# Patient Record
Sex: FEMALE | Race: WHITE | Employment: PART TIME | URBAN - METROPOLITAN AREA
[De-identification: names, ages, dates, MRNs, and addresses within clinical notes are randomized per-mention and may not be internally consistent; named-entity substitution may affect disease eponyms.]

---

## 2018-01-03 ENCOUNTER — HOSPITAL ENCOUNTER (OUTPATIENT)
Dept: MAMMOGRAPHY | Age: 78
Discharge: HOME | End: 2018-01-03
Attending: FAMILY MEDICINE | Admitting: FAMILY MEDICINE

## 2018-01-03 DIAGNOSIS — Z12.31 VISIT FOR SCREENING MAMMOGRAM: ICD-10-CM

## 2019-01-04 ENCOUNTER — HOSPITAL ENCOUNTER (OUTPATIENT)
Dept: MAMMOGRAPHY | Age: 79
Discharge: HOME | End: 2019-01-04
Payer: MEDICARE

## 2019-01-04 DIAGNOSIS — Z12.31 VISIT FOR SCREENING MAMMOGRAM: ICD-10-CM

## 2019-01-04 PROCEDURE — 77063 BREAST TOMOSYNTHESIS BI: CPT

## 2020-01-17 ENCOUNTER — HOSPITAL ENCOUNTER (OUTPATIENT)
Dept: MAMMOGRAPHY | Age: 80
Discharge: HOME | End: 2020-01-17
Payer: MEDICARE

## 2020-01-17 PROCEDURE — 77063 BREAST TOMOSYNTHESIS BI: CPT

## 2021-01-19 ENCOUNTER — HOSPITAL ENCOUNTER (OUTPATIENT)
Dept: MAMMOGRAPHY | Age: 81
Discharge: HOME | End: 2021-01-19
Payer: MEDICARE

## 2021-01-19 DIAGNOSIS — Z12.31 VISIT FOR SCREENING MAMMOGRAM: ICD-10-CM

## 2021-01-19 PROCEDURE — 77063 BREAST TOMOSYNTHESIS BI: CPT

## 2021-02-05 ENCOUNTER — IMMUNIZATION (OUTPATIENT)
Dept: FAMILY MEDICINE CLINIC | Age: 81
End: 2021-02-05
Payer: MEDICARE

## 2021-02-05 PROCEDURE — 91301 COVID-19, MODERNA VACCINE 100MCG/0.5ML DOSE: CPT | Performed by: FAMILY MEDICINE

## 2021-02-05 PROCEDURE — 0011A COVID-19, MODERNA VACCINE 100MCG/0.5ML DOSE: CPT | Performed by: FAMILY MEDICINE

## 2021-03-05 ENCOUNTER — IMMUNIZATION (OUTPATIENT)
Dept: FAMILY MEDICINE CLINIC | Age: 81
End: 2021-03-05
Payer: MEDICARE

## 2021-03-05 PROCEDURE — 91301 COVID-19, MODERNA VACCINE 100MCG/0.5ML DOSE: CPT | Performed by: NURSE PRACTITIONER

## 2021-03-05 PROCEDURE — 0012A COVID-19, MODERNA VACCINE 100MCG/0.5ML DOSE: CPT | Performed by: NURSE PRACTITIONER

## 2022-02-14 ENCOUNTER — HOSPITAL ENCOUNTER (OUTPATIENT)
Dept: MAMMOGRAPHY | Age: 82
Discharge: HOME | End: 2022-02-14
Payer: MEDICARE

## 2022-02-14 VITALS — WEIGHT: 135 LBS | BODY MASS INDEX: 22.49 KG/M2 | HEIGHT: 65 IN

## 2022-02-14 DIAGNOSIS — Z12.31 VISIT FOR SCREENING MAMMOGRAM: ICD-10-CM

## 2022-02-14 PROCEDURE — 77063 BREAST TOMOSYNTHESIS BI: CPT

## 2023-02-16 ENCOUNTER — HOSPITAL ENCOUNTER (OUTPATIENT)
Dept: MAMMOGRAPHY | Age: 83
Discharge: HOME | End: 2023-02-16
Payer: MEDICARE

## 2023-02-16 VITALS — HEIGHT: 65 IN | WEIGHT: 138 LBS | BODY MASS INDEX: 22.99 KG/M2

## 2023-02-16 DIAGNOSIS — Z12.39 SCREENING BREAST EXAMINATION: ICD-10-CM

## 2023-02-16 PROCEDURE — 77067 SCR MAMMO BI INCL CAD: CPT

## 2024-02-21 ENCOUNTER — HOSPITAL ENCOUNTER (OUTPATIENT)
Dept: MAMMOGRAPHY | Age: 84
Discharge: HOME | End: 2024-02-21
Payer: MEDICARE

## 2024-02-21 VITALS — WEIGHT: 128 LBS | BODY MASS INDEX: 21.33 KG/M2 | HEIGHT: 65 IN

## 2024-02-21 DIAGNOSIS — Z12.31 VISIT FOR SCREENING MAMMOGRAM: ICD-10-CM

## 2024-02-21 PROCEDURE — 77063 BREAST TOMOSYNTHESIS BI: CPT

## 2024-02-27 ENCOUNTER — PROCEDURE VISIT (OUTPATIENT)
Dept: SURGERY | Age: 84
End: 2024-02-27
Payer: MEDICARE

## 2024-02-27 VITALS — DIASTOLIC BLOOD PRESSURE: 73 MMHG | HEART RATE: 101 BPM | SYSTOLIC BLOOD PRESSURE: 140 MMHG

## 2024-02-27 DIAGNOSIS — C44.722 SQUAMOUS CELL CARCINOMA OF LEG, RIGHT: Primary | ICD-10-CM

## 2024-02-27 PROCEDURE — 12032 INTMD RPR S/A/T/EXT 2.6-7.5: CPT | Performed by: DERMATOLOGY

## 2024-02-27 PROCEDURE — 17313 MOHS 1 STAGE T/A/L: CPT | Performed by: DERMATOLOGY

## 2024-02-27 NOTE — PROGRESS NOTES
PRE-PROCEDURE SCREENING    Pacemaker/ICD: No  Difficulty with numbing in the past: No  Local Anesthesia Reaction/passing out: No  Latex or adhesive allergy:  No  Bleeding/Clotting Disorders: No  Anticoagulant Therapy: No  Joint prosthesis: No  Artificial Heart Valve: No  Stroke or Seizures: No  Organ Transplant or Lymphoma: No  Immunosuppression: No  Respiratory Problems: No

## 2024-02-27 NOTE — PROGRESS NOTES
MOHS PROCEDURE NOTE    PHYSICIAN:  Gianna Luz MD, Who operated in two distinct and integrated capacities as the surgeon removing the tissue and as the pathologist examining the tissue.    ASSISTANT: Maribeth Goldberg CMA      REFERRING PROVIDER:  Stevan Warren DO    PREOPERATIVE DIAGNOSIS: Invasive well-differentiated Squamous Cell Carcinoma     SPECIFIC MOHS INDICATIONS:  location and need for tissue conservation    AUC SCORIN/9    POSTOPERATIVE DIAGNOSIS: SAME    LOCATION: Right leg    OPERATIVE PROCEDURE:  MOHS MICROGRAPHIC SURGERY    RECONSTRUCTION OF DEFECT: Intermediate layered closure    PREOPERATIVE SIZE: 9 x 6 MM    DEFECT SIZE: 15 x 10 MM    LENGTH OF REPAIRED WOUND/SIZE OF FLAP/SIZE OF GRAFT:  36 MM    ANESTHESIA:  10 mL 1% lidocaine with epinephrine 1:100,000 buffered.     EBL:  MINIMAL    DURATION OF PROCEDURE:  1.0 HOURS    POSTOPERATIVE OBSERVATION: 0.5 HOUR    SPECIMENS:  SEE MOHS MAP    COMPLICATIONS:  NONE    DESCRIPTION OF PROCEDURE:  The patient was given a mirror, as appropriate, and the biopsy site was identified, marked with a surgical marking pen, and verified by the patient.   Options for treatment were discussed and the patient was informed that Mohs surgery was the selected treatment based on its lower recurrence rate, given the features listed above, as compared to other treatment modalities such as excision, radiation, or curettage, and agreed with this treatment plan.  Risks and benefits including bruising, swelling, bleeding, infection, nerve injury, recurrence, and scarring were discussed with the patient prior to the procedure and a written consent detailing these and other risks was reviewed with the patient and signed.    There was a time out for person and procedure verification.  The surgical site was prepped with an antiseptic solution.  Application of an antiseptic solution was repeated before each surgical stage.      Stage I:  The clinically-apparent tumor was

## 2024-02-27 NOTE — PATIENT INSTRUCTIONS
[facility] Office Hours:    Monday-Thursday  7:30 AM-4:30 PM    Friday  9:00 AM-1:00 PM       POST-OPERATIVE CARE FOR STITCHES  Bandage change after 48 hours    CARING FOR YOUR SURGICAL SITE  The bandage should remain on and completely dry for 48 hours. Do NOT get the bandage wet.  After the first 48 hours, gently remove the remaining part of the bandage. It can be helpful to moisten the bandage edges in the shower. Steri strips may still be on the wound. It is ok, they will fall off slowly with the daily bandage changes.  Gently clean the wound daily with mild soap and water. Try to clean off crust and debris.   Dry (pat) the area with a clean Q-tip or gauze.   Apply a layer of Vaseline/ Aquaphor (or Bacitracin if your doctor recommends) to the wound area only.  Cut a piece of Telfa (or any non-stick dressing) to fit just over the wound and secure it with paper tape. If the wound is small you may use a Band- Aid. Keep area covered for a total of 2 week(s).  If the dressing comes off or if you have questions, or concerns about the dressing, please call the office for instructions!    POST OPERATIVE INSTRUCTIONS    Activity: Do not lift anything heavier than a gallon of milk for 1 week. Also, avoid strenuous activity such as running, power walking or contact sports.  Eating and drinking: Do not drink alcohol for 48 hours after your procedure. Alcohol increases the chances of bleeding.  Medicines   -If you have discomfort, take Acetaminophen (Tylenol or Extra Strength Tylenol). Follow the instructions and warning on the bottle.  -If your doctor has prescribed you an Aspirin daily, please keep taking it. Do not take extra Aspirin or medicines containing Aspirin (such as Tammie-Boston and Excedrin) for 48 hours after your procedure.    Bleeding: If bleeding occurs, DO NOT remove the bandage. Put firm pressure on the area with gauze for 20 minutes without peeking. If the bleeding continues, apply

## 2024-02-28 ENCOUNTER — TELEPHONE (OUTPATIENT)
Dept: SURGERY | Age: 84
End: 2024-02-28

## 2024-02-28 NOTE — TELEPHONE ENCOUNTER
The patient was in the office on 02/27/2024 for Mohs located on the RT leg with ILC repair.  The patient tolerated the procedure well and left the office in good condition.    A post-operative telephone call was placed at 10:18a, 02/28/2024, in order to check on the patient's recovery process.  The patient was not able to be reached and a phone message was left. L/M to call back w/any questions/concerns.

## 2024-02-28 NOTE — TELEPHONE ENCOUNTER
PT calling to make sure the redness was normal.  Was a little warm to touch but she assumed it was from the surgery.  Reminded PT to keep leg elevated when sitting. She will keep an eye on it and if the redness extends significantly she will call. She will take bandage off tomorrow.

## 2024-02-28 NOTE — TELEPHONE ENCOUNTER
Patient returning f/u procedure call. Pt says she does have some questions regarding her procedure from yesterday.

## 2024-03-01 ENCOUNTER — HOSPITAL ENCOUNTER (OUTPATIENT)
Dept: ULTRASOUND IMAGING | Age: 84
Discharge: HOME | End: 2024-03-01
Payer: MEDICARE

## 2024-03-01 DIAGNOSIS — R92.8 ABNORMAL MAMMOGRAM: ICD-10-CM

## 2024-03-01 PROCEDURE — 76642 ULTRASOUND BREAST LIMITED: CPT

## 2024-03-12 ENCOUNTER — NURSE ONLY (OUTPATIENT)
Dept: SURGERY | Age: 84
End: 2024-03-12

## 2024-03-12 DIAGNOSIS — Z48.02 VISIT FOR SUTURE REMOVAL: Primary | ICD-10-CM

## 2024-03-12 NOTE — PROGRESS NOTES
S:  The patient is here for suture removal s/p Mohs surgery on the right leg and Intermediate layered closure repair, 2 week(s) ago. The site appears well-healed without signs of infection (redness, pain or discharge). The sutures were removed.  Wound care and activity instructions given. The patient was scheduled for f/u with General Dermatology per their instructions.

## 2024-03-12 NOTE — PATIENT INSTRUCTIONS
[facility] Office Hours:    Monday-Thursday  7:30 AM-4:30 PM    Friday  9:00 AM-1:00 PM     WOUND CARE AFTER SUTURE REMOVAL    After your stiches have been removed, your scar is still very fragile. In fact, scars continue to change and evolve, what we call remodel, for about a year after your procedure.  Follow the following steps below to ensure that your scar heals well.    Instructions    1. If Steri-strips were applied, keep them on until they fall off on their own.  2. Protect your scar from the sun. Use a sunscreen or bandage to cover your scar. Sun exposure can cause your scar to become discolored and appear red or brown.  3. To help soften your scar more rapidly, it is helpful, but not necessary, for you to   massage the scar gently each night for twenty minutes.   4. “Spitting” suture. Occasionally, an inside suture (stitch) does not completely dissolve. When this happens, (generally 4-8 weeks after surgery), it causes a bump or “pimple” to form on the scar. This is easily removed and is not at all serious. It   does not mean the skin cancer has returned. Contact us if it happens, but do not be alarmed.      5. If you scar becomes tender, itchy or becomes very large, let Dr. Luz know.  There    are treatments that can improve the appearance of your scar or help make it more comfortable.

## 2024-04-16 ENCOUNTER — TELEPHONE (OUTPATIENT)
Dept: SURGERY | Age: 84
End: 2024-04-16

## 2024-04-16 NOTE — TELEPHONE ENCOUNTER
The patient is noticing the incision line is raised and is feeling discomfort in the past 24 hours.     The patient declines red or inflammed; more scaling and raised in the area.  Post Mohs on 2/27/24 on the R leg.      Pt unable to send photo in email.

## 2024-04-16 NOTE — TELEPHONE ENCOUNTER
Received patient call back at 2:35 p.m. today.    Patient stated the raised area along her incision line is concerning and she's afraid the skin cancer has returned.  The patient is requesting an office visit.    Patient put on cal'd for Wed afternoon.    No further clinical action required at this time.

## 2024-04-16 NOTE — TELEPHONE ENCOUNTER
The patient was in the office on 2.27.24 for MOHS procedure located on the Right Leg with ILC repair.  The patient returned to office on 3/12/24 for suture removal.    Today the patient called regarding a raised area along her incision line that has become uncomfortable.    A return call was place at 11:38 a.m. in order to gather more information.  There was no answer and a voicemail msg was left with office phone # and hours for the patient to return the call.

## 2024-04-17 ENCOUNTER — OFFICE VISIT (OUTPATIENT)
Dept: SURGERY | Age: 84
End: 2024-04-17

## 2024-04-17 DIAGNOSIS — Z51.89 VISIT FOR WOUND CHECK: Primary | ICD-10-CM

## 2024-04-17 PROCEDURE — 99024 POSTOP FOLLOW-UP VISIT: CPT | Performed by: DERMATOLOGY

## 2024-04-17 NOTE — PROGRESS NOTES
S: The patient is here today for wound/scar check.  The patient had mohs surgery located on the right leg with Intermediate layered closure repair, 6 week(s) ago.  The patient c/o scaly painful area near incision.    EXAM: there is a residual fragment of prolene suture in the area identified by the patient. This was successfully removed without consequence.    IMPRESSION/PLAN:  residual suture fragment reaction. Removed and reassurance to pt. F/u prn

## 2024-04-17 NOTE — PATIENT INSTRUCTIONS
[facility] Office Hours:    Monday-Thursday  7:30 AM-4:30 PM    Friday  9:00 AM-1:00 PM

## 2024-05-16 ENCOUNTER — OFFICE VISIT (OUTPATIENT)
Dept: SURGERY | Age: 84
End: 2024-05-16
Payer: MEDICARE

## 2024-05-16 DIAGNOSIS — Z51.89 VISIT FOR WOUND CHECK: Primary | ICD-10-CM

## 2024-05-16 PROCEDURE — 1090F PRES/ABSN URINE INCON ASSESS: CPT | Performed by: DERMATOLOGY

## 2024-05-16 PROCEDURE — 99212 OFFICE O/P EST SF 10 MIN: CPT | Performed by: DERMATOLOGY

## 2024-05-16 PROCEDURE — G8427 DOCREV CUR MEDS BY ELIG CLIN: HCPCS | Performed by: DERMATOLOGY

## 2024-05-16 PROCEDURE — G8400 PT W/DXA NO RESULTS DOC: HCPCS | Performed by: DERMATOLOGY

## 2024-05-16 PROCEDURE — 1036F TOBACCO NON-USER: CPT | Performed by: DERMATOLOGY

## 2024-05-16 PROCEDURE — 1123F ACP DISCUSS/DSCN MKR DOCD: CPT | Performed by: DERMATOLOGY

## 2024-05-16 PROCEDURE — G8420 CALC BMI NORM PARAMETERS: HCPCS | Performed by: DERMATOLOGY

## 2024-05-16 NOTE — PATIENT INSTRUCTIONS
[facility] Office Hours:    Monday-Thursday  7:30 AM-4:30 PM    Friday  9:00 AM-1:00 PM     Wound Care Instructions  Cleanse the wound with mild soapy water daily.   Gently dry the area.  Apply antibiotic ointment to the wound using a cotton tipped applicator.  Cover with a clean bandage.  Repeat this process until the site is healed.  You may shower and bathe as usual.

## 2024-05-16 NOTE — PROGRESS NOTES
S: The patient is here for scar check s/p mohs on the right lower leg with Intermediate layered closure repair, 10 weeks ago.  The patient was here 4/17 and had a small fragment of prolene suture still in suture line. Today she c/o persistent asx small bump close to incision line.  O: The scar is well-approximated and shows no signs of abnormal healing (expected amount of erythema, fullness and coloration), there is a 3mm pink papule lateral to incision line  A/P:?inflammatory papule, reactive?  Debrided with forceps, apply polysporin rtc if recurs  Patient questions answered and expectations reviewed.  The patient is scheduled for f/u scar check as needed and skin examination with General Dermatology per their recommendations.

## 2025-01-08 ENCOUNTER — TELEPHONE (OUTPATIENT)
Dept: SURGERY | Age: 85
End: 2025-01-08

## 2025-01-08 NOTE — TELEPHONE ENCOUNTER
Patient lvm stating had surgery done on 2/21 scc well-diff on leg and says there is a raised area by the incision.

## 2025-01-08 NOTE — TELEPHONE ENCOUNTER
Spoke with patient. Pt reports small raised area near Mohs incision from 2/27/2024 that she is concerned about. She saw her gen derm, Dr. Mcintyre, last in December and did not have area looked at. She states she saw Dr. Luz for this area after surgery and would like her to take another look at it.

## 2025-01-14 ENCOUNTER — OFFICE VISIT (OUTPATIENT)
Dept: SURGERY | Age: 85
End: 2025-01-14
Payer: MEDICARE

## 2025-01-14 DIAGNOSIS — L72.0 MILIA: Primary | ICD-10-CM

## 2025-01-14 DIAGNOSIS — Z51.89 VISIT FOR WOUND CHECK: ICD-10-CM

## 2025-01-14 PROCEDURE — 10060 I&D ABSCESS SIMPLE/SINGLE: CPT | Performed by: DERMATOLOGY

## 2025-01-14 NOTE — PATIENT INSTRUCTIONS
[facility] Office Hours:    Monday-Thursday  7:30 AM-4:30 PM    Friday  9:00 AM-1:00 PM      Gently wash with soap and water, apply vaseline or aquaphor and cover for one week.

## 2025-01-14 NOTE — PROGRESS NOTES
S: The patient is here for scar check s/p mohs on the right lower leg with Intermediate layered closure repair, 1 year ago.  The patient c/o asx papule in center of scar.  O: The scar is well-approximated and shows no signs of abnormal healing (expected amount of erythema, fullness and coloration), in the center of the scar is a 3mm subcutaneous papule  A/P:EIC/milia: area cleansed with alcohol, small incision made with 11 blade and cyst filled with keratin identified and drained, removed.  Patient questions answered and expectations reviewed.  The patient is scheduled for f/u scar check as needed and skin examination with General Dermatology per their recommendations.

## 2025-02-24 ENCOUNTER — HOSPITAL ENCOUNTER (OUTPATIENT)
Dept: MAMMOGRAPHY | Age: 85
Discharge: HOME | End: 2025-02-24
Payer: MEDICARE

## 2025-02-24 VITALS — HEIGHT: 65 IN | WEIGHT: 126 LBS | BODY MASS INDEX: 20.99 KG/M2

## 2025-02-24 DIAGNOSIS — Z12.31 VISIT FOR SCREENING MAMMOGRAM: ICD-10-CM

## 2025-02-24 PROCEDURE — 77067 SCR MAMMO BI INCL CAD: CPT

## 2025-03-06 ENCOUNTER — PROCEDURE VISIT (OUTPATIENT)
Dept: SURGERY | Age: 85
End: 2025-03-06
Payer: MEDICARE

## 2025-03-06 VITALS — DIASTOLIC BLOOD PRESSURE: 78 MMHG | SYSTOLIC BLOOD PRESSURE: 89 MMHG | HEART RATE: 112 BPM

## 2025-03-06 DIAGNOSIS — C44.722 SQUAMOUS CELL CARCINOMA OF LEG, RIGHT: Primary | ICD-10-CM

## 2025-03-06 PROCEDURE — 17313 MOHS 1 STAGE T/A/L: CPT | Performed by: DERMATOLOGY

## 2025-03-06 NOTE — PROGRESS NOTES
MOHS PROCEDURE NOTE    PHYSICIAN:  Gianna Luz MD, Who operated in two distinct and integrated capacities as the surgeon removing the tissue and as the pathologist examining the tissue.    ASSISTANT: Aroldo Bradford RN     REFERRING PROVIDER:  John Paul Mcintyre M.D.    PREOPERATIVE DIAGNOSIS: Invasive well-differentiated Squamous Cell Carcinoma     SPECIFIC MOHS INDICATIONS:  location and need for tissue conservation    AUC SCORIN/9    POSTOPERATIVE DIAGNOSIS: SAME    LOCATION: Right Shin    OPERATIVE PROCEDURE:  MOHS MICROGRAPHIC SURGERY    RECONSTRUCTION OF DEFECT: Second Intention Wound Healing    PREOPERATIVE SIZE: 16 x 13 MM    DEFECT SIZE: 22 x 19 MM    LENGTH OF REPAIRED WOUND/SIZE OF FLAP/SIZE OF GRAFT:  N/A    ANESTHESIA:  8 mL 1% lidocaine with epinephrine 1:100,000 buffered.     EBL:  MINIMAL    DURATION OF PROCEDURE:  20 MIN    POSTOPERATIVE OBSERVATION: 40 MIN    SPECIMENS:  SEE MOHS MAP    COMPLICATIONS:  NONE    DESCRIPTION OF PROCEDURE:  The patient was given a mirror, as appropriate, and the biopsy site was identified, marked with a surgical marking pen, and verified by the patient.   Options for treatment were discussed and the patient was informed that Mohs surgery was the selected treatment based on its lower recurrence rate, given the features listed above, as compared to other treatment modalities such as excision, radiation, or curettage, and agreed with this treatment plan.  Risks and benefits including bruising, swelling, bleeding, infection, nerve injury, recurrence, and scarring were discussed with the patient prior to the procedure and a written consent detailing these and other risks was reviewed with the patient and signed.    There was a time out for person and procedure verification.  The surgical site was prepped with an antiseptic solution.  Application of an antiseptic solution was repeated before each surgical stage.      Stage I:  The clinically-apparent tumor was

## 2025-03-07 ENCOUNTER — TELEPHONE (OUTPATIENT)
Dept: SURGERY | Age: 85
End: 2025-03-07

## 2025-03-07 NOTE — TELEPHONE ENCOUNTER
The patient was in the office on 3/6/2025 for mohs surgery located on the right shin with 2nd intent wound healing.The patient tolerated the procedure well and left the office in good condition.    A post-operative telephone call was placed at [phone] in order to check on the patient's recovery process.  The patient was not able to be reached and a phone message was left.

## 2025-03-11 ENCOUNTER — TELEPHONE (OUTPATIENT)
Dept: SURGERY | Age: 85
End: 2025-03-11

## 2025-03-11 ENCOUNTER — OFFICE VISIT (OUTPATIENT)
Dept: SURGERY | Age: 85
End: 2025-03-11
Payer: MEDICARE

## 2025-03-11 DIAGNOSIS — Z51.89 VISIT FOR WOUND CHECK: Primary | ICD-10-CM

## 2025-03-11 PROCEDURE — 99214 OFFICE O/P EST MOD 30 MIN: CPT | Performed by: DERMATOLOGY

## 2025-03-11 PROCEDURE — 1159F MED LIST DOCD IN RCRD: CPT | Performed by: DERMATOLOGY

## 2025-03-11 PROCEDURE — 1036F TOBACCO NON-USER: CPT | Performed by: DERMATOLOGY

## 2025-03-11 PROCEDURE — G8420 CALC BMI NORM PARAMETERS: HCPCS | Performed by: DERMATOLOGY

## 2025-03-11 PROCEDURE — G8400 PT W/DXA NO RESULTS DOC: HCPCS | Performed by: DERMATOLOGY

## 2025-03-11 PROCEDURE — 1123F ACP DISCUSS/DSCN MKR DOCD: CPT | Performed by: DERMATOLOGY

## 2025-03-11 PROCEDURE — 1090F PRES/ABSN URINE INCON ASSESS: CPT | Performed by: DERMATOLOGY

## 2025-03-11 PROCEDURE — G8427 DOCREV CUR MEDS BY ELIG CLIN: HCPCS | Performed by: DERMATOLOGY

## 2025-03-11 RX ORDER — CEPHALEXIN 500 MG/1
500 CAPSULE ORAL 3 TIMES DAILY
Qty: 15 CAPSULE | Refills: 0 | Status: SHIPPED | OUTPATIENT
Start: 2025-03-11 | End: 2025-03-16

## 2025-03-11 NOTE — TELEPHONE ENCOUNTER
DOS 3/6/2025 - RT shin - 2nd intention wound healing - PT called to advise that she has had throbbing starting in the middle of night last night w/some increased redness and she is concerned and would like to have the area checked.  She advised she has been elevating, has showered and not been betting it wet. I went over her AVS w/her again and advised she is able to get it wet and after shower to rinse area again w/clear water and pat dry dry.  She is using the vinegar soaks and clean dressing daily.  I asked if she wanted to have it checked due to her concerns and she does, she is coming in today at 2:30p.  Bjj

## 2025-03-11 NOTE — PROGRESS NOTES
S: The patient is here for wound check s/p Mohs surgery on the right shin with second intent wound healing, 5 days(s) ago.  The patient said there was more redness around the wound and a throbbing pain that started in the night she didn't experience until lastnight.  O:  The wound has retained gel foam and slight pinkness immediately around wound.  No purulence/pain/warmth.      A/P:  Chronic problem: is not at treatment goal:  open wound of the right shin s/p Mohs surgery.  Status:  The wound is healing well by second intention.  No s/sx infection/bleeding.     The area was cleansed with a gentle cleanser, a small amount of Aquaphor and a non-stick dressing applied.   Detailed second intention wound care instructions reviewed with the patient including daily gentle cleansing with mild cleanser such as cetaphil, application of topical petrolatum or aquaphor and wound coverage. Reminder to remove excessive fibrin as necessary, best after cleansing when fibrin is less adherent.  Pt education on how to perform this.  Healing time discussed.  The patient is scheduled for f/u wound check in 1.5 week(s).    OTC Meds:  oral antibiotic to pharmacy (only if patient needs it)  if pain becomes more constant or severe, redness intensifies, she should start keflex 500mg tid.  Prescription Meds:  none  Co-morbid conditions affecting healing (I.e. tobacco, diabetes, pvd, peripheral edema, immunosuppression):  no    Electronically signed by Lilly Day RN on 3/11/2025 at 2:45 PM    I, Lilly Day RN, am scribing for and in the presence of Dr.Emily Luz,3/11/2025.    IGianna MD,  personally performed the services described in this documentation as scribed by Lilly Day RN  in my presence, and it is both accurate and complete.     Electronically signed by Gianna Luz MD on 3/11/2025 at 3:21 PM

## 2025-03-12 ENCOUNTER — TELEPHONE (OUTPATIENT)
Dept: SURGERY | Age: 85
End: 2025-03-12

## 2025-03-12 NOTE — TELEPHONE ENCOUNTER
The patient said had questions about cleaning the wound and the fibrin. I told her to gently use a q-tip and twirl it around the open area; also told her she could use a gauze and that can help keep the fibrin to a minimum. She just wanted to be sure she was cleaning it right. The patient verbalized understanding and had no further questions.

## 2025-03-14 ENCOUNTER — TELEPHONE (OUTPATIENT)
Dept: SURGERY | Age: 85
End: 2025-03-14

## 2025-03-14 NOTE — TELEPHONE ENCOUNTER
Spoke to Duncan regarding her concern about not being able to remove all of the fibrin from the right shin, 2nd int (DOS 3.6.25).  Patient was seen in office ahead of her 2 wk f/u due to concerns about redness and was prescribed abx, to which she states she started 48 hours ago.    Alleviated pt concerns regarding the normalcy of the fibrin. Encouraged her to premedicate with OTC pain medicine prior to cleansing wound.    She stated this was her first time with a wound like this and wanted to make sure she was doing everything correctly.  Patient stated she felt more at ease after our conversation.    No further clinical action required at this time.

## 2025-03-19 ENCOUNTER — OFFICE VISIT (OUTPATIENT)
Dept: SURGERY | Age: 85
End: 2025-03-19
Payer: MEDICARE

## 2025-03-19 DIAGNOSIS — Z51.89 VISIT FOR WOUND CHECK: Primary | ICD-10-CM

## 2025-03-19 PROCEDURE — 1159F MED LIST DOCD IN RCRD: CPT | Performed by: DERMATOLOGY

## 2025-03-19 PROCEDURE — G8400 PT W/DXA NO RESULTS DOC: HCPCS | Performed by: DERMATOLOGY

## 2025-03-19 PROCEDURE — 1036F TOBACCO NON-USER: CPT | Performed by: DERMATOLOGY

## 2025-03-19 PROCEDURE — G8427 DOCREV CUR MEDS BY ELIG CLIN: HCPCS | Performed by: DERMATOLOGY

## 2025-03-19 PROCEDURE — 99213 OFFICE O/P EST LOW 20 MIN: CPT | Performed by: DERMATOLOGY

## 2025-03-19 PROCEDURE — 1090F PRES/ABSN URINE INCON ASSESS: CPT | Performed by: DERMATOLOGY

## 2025-03-19 PROCEDURE — 1123F ACP DISCUSS/DSCN MKR DOCD: CPT | Performed by: DERMATOLOGY

## 2025-03-19 PROCEDURE — G8420 CALC BMI NORM PARAMETERS: HCPCS | Performed by: DERMATOLOGY

## 2025-03-19 RX ORDER — ACETAMINOPHEN 325 MG/1
650 TABLET ORAL EVERY 6 HOURS PRN
COMMUNITY

## 2025-03-19 NOTE — PROGRESS NOTES
S: The patient is here for wound check s/p Mohs surgery on the right lower leg with second intent wound healing, 2 week(s) ago.  The patient was in last week c/o pain and erythema. She completed a 5day course of keflex 500mg tid which alleviated her sx..  O:  The wound has moderate fibrin.  No erythema/purulence/pain.      A/P:  Chronic problem: is not at treatment goal:  open wound of the right lower leg s/p Mohs surgery.  Status:  The wound is healing well by second intention.  No s/sx infection/bleeding. Fibrin debrided with forceps    The area was cleansed with a gentle cleanser, a small amount of Aquaphor and a non-stick dressing applied.   Detailed second intention wound care instructions reviewed with the patient including daily gentle cleansing with mild cleanser such as cetaphil, application of topical petrolatum or aquaphor and wound coverage. Reminder to remove excessive fibrin as necessary, best after cleansing when fibrin is less adherent.  Pt education on how to perform this.  Healing time discussed.  The patient is scheduled for f/u wound check in 3 week(s).    OTC Meds:  cont aquaphor  Prescription Meds:  no  Co-morbid conditions affecting healing (I.e. tobacco, diabetes, pvd, peripheral edema, immunosuppression):  no

## 2025-03-19 NOTE — PATIENT INSTRUCTIONS
[facility] Office Hours:    Monday-Thursday  7:30 AM-4:30 PM    Friday  9:00 AM-1:00 PM     PT is to continue daily cleaning, (rinse after shower)  may use he vinegar  wash if she chooses, (rinse) clean w/ circular motion dry quetip. Apply light vaseline or aquaphor.  Bandage daily. Return in 4 weeks.

## 2025-03-27 ENCOUNTER — TELEPHONE (OUTPATIENT)
Dept: SURGERY | Age: 85
End: 2025-03-27

## 2025-03-27 NOTE — TELEPHONE ENCOUNTER
Called patient, she states that after finishing her keflex from Dr. Luz she was doing fine but noticed yesterday that she had some throbbing pain that came and went and also had some at night. She states she has been trying to remove fibrin as directed by staff at last appointment but feels like she is not doing a good enough job. She denies redness or warm to touch around wound. Explained that sometime nerve pain can come and go and could be some of what she is experiencing. Also explained that if fibrin gets build up in the wound can cause some pain. Patient again states that pain is not constant and no warmth to touch or redness around wound. Advised patient to try to remove fibrin as best she can and if having issues to call office and could add to the schedule. Also informed patient that if would increases in pain, redness, warmth to touch to call office.

## 2025-03-27 NOTE — TELEPHONE ENCOUNTER
Patient had Mohs surgery on shin 3/6. She is c/o throbbing pain and wanted to know if someone could look at the wound. She does have a wound check 4/16.

## 2025-04-16 ENCOUNTER — OFFICE VISIT (OUTPATIENT)
Dept: SURGERY | Age: 85
End: 2025-04-16
Payer: MEDICARE

## 2025-04-16 DIAGNOSIS — Z51.89 VISIT FOR WOUND CHECK: Primary | ICD-10-CM

## 2025-04-16 PROCEDURE — 1036F TOBACCO NON-USER: CPT | Performed by: DERMATOLOGY

## 2025-04-16 PROCEDURE — 1159F MED LIST DOCD IN RCRD: CPT | Performed by: DERMATOLOGY

## 2025-04-16 PROCEDURE — 99213 OFFICE O/P EST LOW 20 MIN: CPT | Performed by: DERMATOLOGY

## 2025-04-16 PROCEDURE — 1123F ACP DISCUSS/DSCN MKR DOCD: CPT | Performed by: DERMATOLOGY

## 2025-04-16 PROCEDURE — G8427 DOCREV CUR MEDS BY ELIG CLIN: HCPCS | Performed by: DERMATOLOGY

## 2025-04-16 PROCEDURE — G8420 CALC BMI NORM PARAMETERS: HCPCS | Performed by: DERMATOLOGY

## 2025-04-16 PROCEDURE — 1090F PRES/ABSN URINE INCON ASSESS: CPT | Performed by: DERMATOLOGY

## 2025-04-16 PROCEDURE — G8400 PT W/DXA NO RESULTS DOC: HCPCS | Performed by: DERMATOLOGY

## 2025-04-16 NOTE — PROGRESS NOTES
S: The patient is here for wound check s/p Mohs surgery on the right shin with second intent wound healing, 6 week(s) ago.  The patient has no complaints.  O:  The wound has minimal fibrin.  No erythema/purulence/pain.      A/P:  Chronic problem: is not at treatment goal:  open wound of the right shin s/p Mohs surgery.  Status:  The wound is healing well by second intention.  No s/sx infection/bleeding. Fibrin debrided with forceps    The area was cleansed with a gentle cleanser, a small amount of Aquaphor and a non-stick dressing applied.   Detailed second intention wound care instructions reviewed with the patient including daily gentle cleansing with mild cleanser such as cetaphil, application of topical petrolatum or aquaphor and wound coverage. Reminder to remove excessive fibrin as necessary, best after cleansing when fibrin is less adherent.  Pt education on how to perform this.  Healing time discussed.  The patient is scheduled for f/u wound check in 4 week(s).    OTC Meds:  cont aquaphor or vaseline daily  Prescription Meds:  no  Co-morbid conditions affecting healing (I.e. tobacco, diabetes, pvd, peripheral edema, immunosuppression):  no

## 2025-05-14 ENCOUNTER — OFFICE VISIT (OUTPATIENT)
Dept: SURGERY | Age: 85
End: 2025-05-14
Payer: MEDICARE

## 2025-05-14 DIAGNOSIS — L57.0 ACTINIC KERATOSIS: Primary | ICD-10-CM

## 2025-05-14 DIAGNOSIS — Z51.89 VISIT FOR WOUND CHECK: ICD-10-CM

## 2025-05-14 PROCEDURE — 1036F TOBACCO NON-USER: CPT | Performed by: DERMATOLOGY

## 2025-05-14 PROCEDURE — 1159F MED LIST DOCD IN RCRD: CPT | Performed by: DERMATOLOGY

## 2025-05-14 PROCEDURE — G8420 CALC BMI NORM PARAMETERS: HCPCS | Performed by: DERMATOLOGY

## 2025-05-14 PROCEDURE — 17000 DESTRUCT PREMALG LESION: CPT | Performed by: DERMATOLOGY

## 2025-05-14 PROCEDURE — 99212 OFFICE O/P EST SF 10 MIN: CPT | Performed by: DERMATOLOGY

## 2025-05-14 PROCEDURE — G8427 DOCREV CUR MEDS BY ELIG CLIN: HCPCS | Performed by: DERMATOLOGY

## 2025-05-14 PROCEDURE — 1090F PRES/ABSN URINE INCON ASSESS: CPT | Performed by: DERMATOLOGY

## 2025-05-14 PROCEDURE — 1123F ACP DISCUSS/DSCN MKR DOCD: CPT | Performed by: DERMATOLOGY

## 2025-05-14 PROCEDURE — G8400 PT W/DXA NO RESULTS DOC: HCPCS | Performed by: DERMATOLOGY

## 2025-05-14 NOTE — PATIENT INSTRUCTIONS
[facility] Office Hours:    Monday-Thursday  7:30 AM-4:30 PM    Friday  9:00 AM-1:00 PM     PT is doing well and does not have to return unless she has concerns.

## 2025-05-14 NOTE — PROGRESS NOTES
S: The patient is here for wound check s/p Mohs surgery on the right lower leg with second intent wound healing, 10 week(s) ago.  The patient c/o new red scaly spot lateral to mohs site. No previous tx.   O:  The wound has healed.  No erythema/purulence/pain. No s/sx of recurrence  Lateral to the wound and not near the mohs site is a 6mm keratotic scaly papule     A/P:  Chronic problem: is at treatment goal:  closed wound of the right lower leg s/p Mohs surgery.  Status:  The wound is healing well by second intention.  No s/sx infection/bleeding.     2. Liquid nitrogen cryotherapy for actinic keratosis X 1 on right lateral lower leg.    After risk associated with the procedure including but not limited to pain, bleeding, infection, blister formation, dyspigmention, scar formation and possible need for another treatment at a later date discussed with the patient, verbal (or written) consent was obtained with teach back. Area was treated with liquid nitrogen cryotherapy in 2 freeze-thaw cycles to 1 lesion(s) for sufficient duration of time to create an ice ball, utilizing the following modality; cryogun. Patient tolerated the procedure well, there were no immediate complications, no blood loss, after care discussed with the patient. Patient left the unit in stable good medical condition. Patient will follow up as  needed. If lesion does not respond to ln2, rec follow up with her gen derm.

## 2025-08-25 ENCOUNTER — PROCEDURE VISIT (OUTPATIENT)
Dept: SURGERY | Age: 85
End: 2025-08-25
Payer: MEDICARE

## 2025-08-25 VITALS — HEART RATE: 113 BPM | SYSTOLIC BLOOD PRESSURE: 158 MMHG | DIASTOLIC BLOOD PRESSURE: 94 MMHG

## 2025-08-25 DIAGNOSIS — C44.729 SQUAMOUS CELL CARCINOMA OF LEFT LOWER LEG: Primary | ICD-10-CM

## 2025-08-25 DIAGNOSIS — C44.729 SQUAMOUS CELL CARCINOMA OF LEG, LEFT: ICD-10-CM

## 2025-08-25 PROCEDURE — 17313 MOHS 1 STAGE T/A/L: CPT | Performed by: DERMATOLOGY

## 2025-08-25 PROCEDURE — 12032 INTMD RPR S/A/T/EXT 2.6-7.5: CPT | Performed by: DERMATOLOGY

## 2025-08-25 PROCEDURE — 14021 TIS TRNFR S/A/L 10.1-30 SQCM: CPT | Performed by: DERMATOLOGY

## 2025-08-26 ENCOUNTER — TELEPHONE (OUTPATIENT)
Dept: SURGERY | Age: 85
End: 2025-08-26